# Patient Record
(demographics unavailable — no encounter records)

---

## 2025-02-16 NOTE — PHYSICAL EXAM
[Appropriately responsive] : appropriately responsive [Alert] : alert [No Acute Distress] : no acute distress [FreeTextEntry1] : vulvar erythema noted at the vaginal thigh fold bilaterally  No indicators present

## 2025-02-16 NOTE — HISTORY OF PRESENT ILLNESS
[FreeTextEntry1] :  CORINNA LENZ ,46 YO,  History of heavy bleeding now complaining of light irregular spotting/menstruation.  Not sure when mother went into menopause.   PMHX: HTN and pre diabetic PSHX: Tubal ligation, breast implants, C-sec OBHX: One child born @ 25 weeks and passed away, (C-sec1) ( x2) (STOP) GYNHX: h/o simple cyst with spontaneous resolution  Not a smoker LMP: 2025 Medication Losartan Potassium 50MG Sexually active. Not using any contraceptives Last pap was in 2024 Last Mammo in 10/2024 No family History of breast cancer. (father had prostate cancer)  Allergic  Magnesium sulfate

## 2025-02-16 NOTE — HISTORY OF PRESENT ILLNESS
[FreeTextEntry1] :  CORINNA LENZ ,44 YO,  History of heavy bleeding now complaining of light irregular spotting/menstruation.  Not sure when mother went into menopause.   PMHX: HTN and pre diabetic PSHX: Tubal ligation, breast implants, C-sec OBHX: One child born @ 25 weeks and passed away, (C-sec1) ( x2) (STOP) GYNHX: h/o simple cyst with spontaneous resolution  Not a smoker LMP: 2025 Medication Losartan Potassium 50MG Sexually active. Not using any contraceptives Last pap was in 2024 Last Mammo in 10/2024 No family History of breast cancer. (father had prostate cancer)  Allergic  Magnesium sulfate

## 2025-02-16 NOTE — PHYSICAL EXAM
[Appropriately responsive] : appropriately responsive [Alert] : alert [No Acute Distress] : no acute distress [FreeTextEntry1] : vulvar erythema noted at the vaginal thigh fold bilaterally

## 2025-02-16 NOTE — PLAN
[FreeTextEntry1] : Mild depression--> rule out medical cause--> endocrine panel sent  contact dermatitis of the vulva vs fungal infection  eRx clotrimazole betamathasone  Follow up via telehealth visit in 10-14 days

## 2025-02-25 NOTE — HISTORY OF PRESENT ILLNESS
[FreeTextEntry1] : CORINNA LENZ ,46 YO,  Complains about getting her period twice a month since 2024 OBHX: One child born @ 25 weeks and passed away, (C-sec1) ( x2) (STOP) GYNHX: h/o simple cyst with spontaneous resolution LMP: 02/15/2025 Sexually active. Not using any contraceptives Last pap was in 2024